# Patient Record
Sex: MALE | Race: WHITE | NOT HISPANIC OR LATINO | Employment: OTHER | URBAN - METROPOLITAN AREA
[De-identification: names, ages, dates, MRNs, and addresses within clinical notes are randomized per-mention and may not be internally consistent; named-entity substitution may affect disease eponyms.]

---

## 2024-04-26 ENCOUNTER — OFFICE VISIT (OUTPATIENT)
Dept: FAMILY MEDICINE | Facility: CLINIC | Age: 84
End: 2024-04-26
Payer: MEDICARE

## 2024-04-26 ENCOUNTER — LAB VISIT (OUTPATIENT)
Dept: LAB | Facility: CLINIC | Age: 84
End: 2024-04-26
Payer: MEDICARE

## 2024-04-26 VITALS
OXYGEN SATURATION: 98 % | HEIGHT: 73 IN | HEART RATE: 60 BPM | TEMPERATURE: 99 F | WEIGHT: 185.63 LBS | DIASTOLIC BLOOD PRESSURE: 72 MMHG | SYSTOLIC BLOOD PRESSURE: 134 MMHG | BODY MASS INDEX: 24.6 KG/M2

## 2024-04-26 DIAGNOSIS — E78.2 MIXED HYPERLIPIDEMIA: ICD-10-CM

## 2024-04-26 DIAGNOSIS — I10 PRIMARY HYPERTENSION: ICD-10-CM

## 2024-04-26 DIAGNOSIS — Z86.73 HISTORY OF CVA (CEREBROVASCULAR ACCIDENT): ICD-10-CM

## 2024-04-26 DIAGNOSIS — Z76.89 ENCOUNTER TO ESTABLISH CARE: ICD-10-CM

## 2024-04-26 DIAGNOSIS — I71.43 INFRARENAL ABDOMINAL AORTIC ANEURYSM (AAA) WITHOUT RUPTURE: ICD-10-CM

## 2024-04-26 DIAGNOSIS — Z95.1 HX OF CABG: ICD-10-CM

## 2024-04-26 DIAGNOSIS — E78.5 DYSLIPIDEMIA: ICD-10-CM

## 2024-04-26 DIAGNOSIS — M35.3 POLYMYALGIA RHEUMATICA: ICD-10-CM

## 2024-04-26 DIAGNOSIS — Z76.89 ENCOUNTER TO ESTABLISH CARE: Primary | ICD-10-CM

## 2024-04-26 PROBLEM — I71.40 ABDOMINAL AORTIC ANEURYSM WITHOUT RUPTURE: Status: ACTIVE | Noted: 2024-01-18

## 2024-04-26 PROBLEM — I25.10 ARTERIOSCLEROSIS OF CORONARY ARTERY: Status: ACTIVE | Noted: 2024-04-26

## 2024-04-26 PROBLEM — R73.03 PREDIABETES: Status: ACTIVE | Noted: 2024-04-26

## 2024-04-26 PROBLEM — I48.0 PAROXYSMAL ATRIAL FIBRILLATION: Status: ACTIVE | Noted: 2024-01-18

## 2024-04-26 PROBLEM — R26.0 ATAXIC GAIT: Status: ACTIVE | Noted: 2024-04-26

## 2024-04-26 PROBLEM — F32.9 MAJOR DEPRESSIVE DISORDER: Status: ACTIVE | Noted: 2024-04-26

## 2024-04-26 LAB
ALBUMIN SERPL BCP-MCNC: 3.8 G/DL (ref 3.5–5.2)
ALP SERPL-CCNC: 71 U/L (ref 55–135)
ALT SERPL W/O P-5'-P-CCNC: 16 U/L (ref 10–44)
ANION GAP SERPL CALC-SCNC: 8 MMOL/L (ref 8–16)
AST SERPL-CCNC: 21 U/L (ref 10–40)
BASOPHILS # BLD AUTO: 0.03 K/UL (ref 0–0.2)
BASOPHILS NFR BLD: 0.5 % (ref 0–1.9)
BILIRUB SERPL-MCNC: 1 MG/DL (ref 0.1–1)
BUN SERPL-MCNC: 11 MG/DL (ref 8–23)
CALCIUM SERPL-MCNC: 9.5 MG/DL (ref 8.7–10.5)
CHLORIDE SERPL-SCNC: 104 MMOL/L (ref 95–110)
CHOLEST SERPL-MCNC: 118 MG/DL (ref 120–199)
CHOLEST/HDLC SERPL: 2.5 {RATIO} (ref 2–5)
CO2 SERPL-SCNC: 27 MMOL/L (ref 23–29)
CREAT SERPL-MCNC: 0.9 MG/DL (ref 0.5–1.4)
DIFFERENTIAL METHOD BLD: ABNORMAL
EOSINOPHIL # BLD AUTO: 0.1 K/UL (ref 0–0.5)
EOSINOPHIL NFR BLD: 2.3 % (ref 0–8)
ERYTHROCYTE [DISTWIDTH] IN BLOOD BY AUTOMATED COUNT: 12.9 % (ref 11.5–14.5)
EST. GFR  (NO RACE VARIABLE): >60 ML/MIN/1.73 M^2
GLUCOSE SERPL-MCNC: 128 MG/DL (ref 70–110)
HCT VFR BLD AUTO: 41.9 % (ref 40–54)
HDLC SERPL-MCNC: 48 MG/DL (ref 40–75)
HDLC SERPL: 40.7 % (ref 20–50)
HGB BLD-MCNC: 14.3 G/DL (ref 14–18)
IMM GRANULOCYTES # BLD AUTO: 0.02 K/UL (ref 0–0.04)
IMM GRANULOCYTES NFR BLD AUTO: 0.3 % (ref 0–0.5)
LDLC SERPL CALC-MCNC: 54.4 MG/DL (ref 63–159)
LYMPHOCYTES # BLD AUTO: 1.3 K/UL (ref 1–4.8)
LYMPHOCYTES NFR BLD: 21.9 % (ref 18–48)
MCH RBC QN AUTO: 31.8 PG (ref 27–31)
MCHC RBC AUTO-ENTMCNC: 34.1 G/DL (ref 32–36)
MCV RBC AUTO: 93 FL (ref 82–98)
MONOCYTES # BLD AUTO: 0.5 K/UL (ref 0.3–1)
MONOCYTES NFR BLD: 8.2 % (ref 4–15)
NEUTROPHILS # BLD AUTO: 3.9 K/UL (ref 1.8–7.7)
NEUTROPHILS NFR BLD: 66.8 % (ref 38–73)
NONHDLC SERPL-MCNC: 70 MG/DL
NRBC BLD-RTO: 0 /100 WBC
PLATELET # BLD AUTO: 304 K/UL (ref 150–450)
PMV BLD AUTO: 8.8 FL (ref 9.2–12.9)
POTASSIUM SERPL-SCNC: 4.3 MMOL/L (ref 3.5–5.1)
PROT SERPL-MCNC: 6.5 G/DL (ref 6–8.4)
RBC # BLD AUTO: 4.49 M/UL (ref 4.6–6.2)
SODIUM SERPL-SCNC: 139 MMOL/L (ref 136–145)
TRIGL SERPL-MCNC: 78 MG/DL (ref 30–150)
TSH SERPL DL<=0.005 MIU/L-ACNC: 2.38 UIU/ML (ref 0.4–4)
VIT B12 SERPL-MCNC: 570 PG/ML (ref 210–950)
WBC # BLD AUTO: 5.76 K/UL (ref 3.9–12.7)

## 2024-04-26 PROCEDURE — 85025 COMPLETE CBC W/AUTO DIFF WBC: CPT | Performed by: STUDENT IN AN ORGANIZED HEALTH CARE EDUCATION/TRAINING PROGRAM

## 2024-04-26 PROCEDURE — 80053 COMPREHEN METABOLIC PANEL: CPT | Performed by: STUDENT IN AN ORGANIZED HEALTH CARE EDUCATION/TRAINING PROGRAM

## 2024-04-26 PROCEDURE — 80061 LIPID PANEL: CPT | Performed by: STUDENT IN AN ORGANIZED HEALTH CARE EDUCATION/TRAINING PROGRAM

## 2024-04-26 PROCEDURE — 36415 COLL VENOUS BLD VENIPUNCTURE: CPT | Mod: ,,, | Performed by: STUDENT IN AN ORGANIZED HEALTH CARE EDUCATION/TRAINING PROGRAM

## 2024-04-26 PROCEDURE — 84443 ASSAY THYROID STIM HORMONE: CPT | Performed by: STUDENT IN AN ORGANIZED HEALTH CARE EDUCATION/TRAINING PROGRAM

## 2024-04-26 PROCEDURE — 82607 VITAMIN B-12: CPT | Performed by: STUDENT IN AN ORGANIZED HEALTH CARE EDUCATION/TRAINING PROGRAM

## 2024-04-26 PROCEDURE — 99214 OFFICE O/P EST MOD 30 MIN: CPT | Mod: S$GLB,,, | Performed by: STUDENT IN AN ORGANIZED HEALTH CARE EDUCATION/TRAINING PROGRAM

## 2024-04-26 RX ORDER — TAMSULOSIN HYDROCHLORIDE 0.4 MG/1
CAPSULE ORAL DAILY
COMMUNITY

## 2024-04-26 RX ORDER — AMOXICILLIN 500 MG
CAPSULE ORAL DAILY
COMMUNITY

## 2024-04-26 RX ORDER — MULTIVITAMIN
1 TABLET ORAL DAILY
COMMUNITY

## 2024-04-26 RX ORDER — MONTELUKAST SODIUM 10 MG/1
10 TABLET ORAL NIGHTLY
COMMUNITY

## 2024-04-26 RX ORDER — CARVEDILOL 3.12 MG/1
3.12 TABLET ORAL 2 TIMES DAILY WITH MEALS
COMMUNITY

## 2024-04-26 RX ORDER — LANSOPRAZOLE 30 MG/1
30 TABLET, ORALLY DISINTEGRATING, DELAYED RELEASE ORAL DAILY
COMMUNITY

## 2024-04-26 RX ORDER — ERGOCALCIFEROL 1.25 MG/1
50000 CAPSULE ORAL
COMMUNITY

## 2024-04-26 RX ORDER — CLOTRIMAZOLE 1 G/ML
SOLUTION TOPICAL 2 TIMES DAILY
COMMUNITY

## 2024-04-26 RX ORDER — UBIDECARENONE 30 MG
30 CAPSULE ORAL 3 TIMES DAILY
COMMUNITY

## 2024-04-26 RX ORDER — ALBUTEROL SULFATE 0.63 MG/3ML
0.63 SOLUTION RESPIRATORY (INHALATION) EVERY 6 HOURS PRN
COMMUNITY

## 2024-04-26 RX ORDER — SIMVASTATIN 40 MG/1
40 TABLET, FILM COATED ORAL
COMMUNITY
Start: 2024-01-24

## 2024-04-26 RX ORDER — ASCORBIC ACID 500 MG
500 TABLET ORAL DAILY
COMMUNITY

## 2024-04-26 RX ORDER — HYDROXYCHLOROQUINE SULFATE 200 MG/1
TABLET, FILM COATED ORAL DAILY
COMMUNITY

## 2024-04-26 RX ORDER — FLUOXETINE HYDROCHLORIDE 40 MG/1
40 CAPSULE ORAL DAILY
COMMUNITY

## 2024-04-26 NOTE — PROGRESS NOTES
"  [facility]    [facility]  [address], [address]  [place] [address]    Subjective     Patient ID: Roy Kahler is a 84 y.o. male who comes to the clinic to establish care. Just moved here from [place].     Chief Complaint: Establish Care    COPD - quit smoking in 1996, smoked for 40 years    History of CABG - double bypass in 1996 in [place]    History of CVA - occurred in December 2023, is on eliquis 5mg twice daily    DLD - on simvastatin    Depression - takes fluoxetine    HTN - takes coreg 3.125mg BID    GERD - takes lansoprazole    AAA - seen on imaging in [place]    Polymyalgia Rheumatica - takes Plaquenil 200mg daily[place]    BPH - takes flomax    ROS negative unless stated above       Objective     Vitals:    04/26/24 0940   BP: 134/72   BP Location: Left arm   Patient Position: Sitting   BP Method: Medium (Manual)   Pulse: 60   Temp: 98.6 °F (37 °C)   SpO2: 98%   Weight: 84.2 kg (185 lb 9.6 oz)   Height: 6' 1" (1.854 m)        Wt Readings from Last 3 Encounters:   04/26/24 0940 84.2 kg (185 lb 9.6 oz)        Physical Exam  Vitals reviewed.   Constitutional:       Appearance: Normal appearance.   HENT:      Head: Normocephalic and atraumatic.   Eyes:      Extraocular Movements: Extraocular movements intact.      Pupils: Pupils are equal, round, and reactive to light.   Cardiovascular:      Rate and Rhythm: Normal rate.      Pulses: Normal pulses.      Heart sounds: Normal heart sounds.   Pulmonary:      Effort: Pulmonary effort is normal. No respiratory distress.      Breath sounds: Normal breath sounds.   Musculoskeletal:         General: Normal range of motion.      Cervical back: Normal range of motion and neck supple.   Skin:     General: Skin is dry.      Capillary Refill: Capillary refill takes less than 2 seconds.   Neurological:      General: No focal deficit present.      Mental Status: He is alert and " oriented to person, place, and time. Mental status is at baseline.   Psychiatric:         Mood and Affect: Mood normal.         Behavior: Behavior normal.         Thought Content: Thought content normal.         Current Outpatient Medications   Medication Instructions    albuterol (ACCUNEB) 0.63 mg, Nebulization, Every 6 hours PRN, Rescue    apixaban (ELIQUIS) 5 mg    ascorbic acid (vitamin C) (VITAMIN C) 500 mg, Oral, Daily    carvediloL (COREG) 3.125 mg, Oral, 2 times daily with meals    clotrimazole (LOTRIMIN) 1 % Soln Topical (Top), 2 times daily    co-enzyme Q-10 30 mg, Oral, 3 times daily    ergocalciferol (VITAMIN D2) 50,000 Units, Oral, Every 7 days    FLUoxetine 40 mg, Oral, Daily    hydroxychloroquine (PLAQUENIL) 200 mg tablet Oral, Daily    lansoprazole (PREVACID SOLUTAB) 30 mg, Oral, Daily    montelukast (SINGULAIR) 10 mg, Oral, Nightly    multivitamin (ONE DAILY MULTIVITAMIN) per tablet 1 tablet, Oral, Daily    omega-3 fatty acids/fish oil (FISH OIL-OMEGA-3 FATTY ACIDS) 300-1,000 mg capsule Oral, Daily    simvastatin (ZOCOR) 40 mg    tamsulosin (FLOMAX) 0.4 mg Cap Oral, Daily           Assessment and Plan     1. Encounter to establish care  -     Comprehensive metabolic panel; Future; Expected date: 04/26/2024  -     CBC auto differential; Future; Expected date: 04/26/2024  -     Vitamin B12; Future; Expected date: 04/26/2024  -     TSH; Future; Expected date: 04/26/2024    2. Mixed hyperlipidemia  -     Lipid panel; Future; Expected date: 04/26/2024    3. Hx of CABG  Overview:  Double bypass in 1996 in [place]      4. History of CVA (cerebrovascular accident)  Overview:  December 2023 in [place], still has gait issues      5. Infrarenal abdominal aortic aneurysm (AAA) without rupture  -     US Abdominal Aorta; Future; Expected date: 04/26/2024    6. Primary hypertension    7. Dyslipidemia    8. Polymyalgia rheumatica        Here to establish care. Very nice gentleman. Just moved here from  [place]    For PMR, I will write his plaquenil when he needs it    For HTN, at goal, continue coreg    For DLD, continue statin    For h/o CVA, continue statin and eliquis    For afib, continue eliquis    For depression, continue fluoxetine    Continue other chronic meds for conditions not listed above    Labs as above    RTC in 3 months, refills then         I encouraged the patient to take all medications as prescribed and to keep follow up appointments with their providers. Patient stated they had no other concerns. Questions were invited and answered. Follow up sooner if needed.     Cristhian Hu MD  04/26/2024 9:32 AM

## 2024-05-10 ENCOUNTER — HOSPITAL ENCOUNTER (OUTPATIENT)
Dept: RADIOLOGY | Facility: HOSPITAL | Age: 84
Discharge: HOME | End: 2024-05-10
Attending: STUDENT IN AN ORGANIZED HEALTH CARE EDUCATION/TRAINING PROGRAM
Payer: MEDICARE

## 2024-05-10 DIAGNOSIS — I71.43 INFRARENAL ABDOMINAL AORTIC ANEURYSM (AAA) WITHOUT RUPTURE: ICD-10-CM

## 2024-05-10 PROCEDURE — 76775 US EXAM ABDO BACK WALL LIM: CPT | Mod: TC

## 2024-05-10 PROCEDURE — 76775 US EXAM ABDO BACK WALL LIM: CPT | Mod: 26,,, | Performed by: RADIOLOGY

## 2024-07-08 RX ORDER — HYDROXYCHLOROQUINE SULFATE 200 MG/1
200 TABLET, FILM COATED ORAL DAILY
Qty: 90 TABLET | Refills: 1 | Status: SHIPPED | OUTPATIENT
Start: 2024-07-08

## 2024-07-08 NOTE — TELEPHONE ENCOUNTER
Last office visit: 4/26/2024  ----- Message from Enmanuel Mazariegos sent at 7/8/2024  1:34 PM CDT -----  Regarding: refill  Contact: patient  Type:  RX Refill Request    Who Called: patient  Refill or New Rx:new order/ new pharmacy  RX Name and Strength:hydroxychloroquine (PLAQUENIL) 200 mg tablet  How is the patient currently taking it? (ex. 1XDay):  Is this a 30 day or 90 day RX:90  Preferred Pharmacy with phone number:    [facility] - [place], [place] - [address]  [address] [address]  Phone: [phone] Fax: [phone]    Local or Mail Order:local  Ordering Provider:Rogerio  Would the patient rather a call back or a response via [facility]? New pharmacy  Best Call Back Number:[phone]  Additional Information:

## 2024-07-22 ENCOUNTER — OFFICE VISIT (OUTPATIENT)
Dept: FAMILY MEDICINE | Facility: CLINIC | Age: 84
End: 2024-07-22
Payer: MEDICARE

## 2024-07-22 VITALS
HEIGHT: 73 IN | SYSTOLIC BLOOD PRESSURE: 118 MMHG | WEIGHT: 187.5 LBS | HEART RATE: 70 BPM | OXYGEN SATURATION: 96 % | DIASTOLIC BLOOD PRESSURE: 68 MMHG | BODY MASS INDEX: 24.85 KG/M2

## 2024-07-22 DIAGNOSIS — I25.10 ARTERIOSCLEROSIS OF CORONARY ARTERY: ICD-10-CM

## 2024-07-22 DIAGNOSIS — I71.43 INFRARENAL ABDOMINAL AORTIC ANEURYSM (AAA) WITHOUT RUPTURE: ICD-10-CM

## 2024-07-22 DIAGNOSIS — M35.3 POLYMYALGIA RHEUMATICA: ICD-10-CM

## 2024-07-22 DIAGNOSIS — I48.0 PAROXYSMAL ATRIAL FIBRILLATION: ICD-10-CM

## 2024-07-22 DIAGNOSIS — I10 PRIMARY HYPERTENSION: Primary | ICD-10-CM

## 2024-07-22 PROCEDURE — 99214 OFFICE O/P EST MOD 30 MIN: CPT | Mod: S$GLB,,, | Performed by: STUDENT IN AN ORGANIZED HEALTH CARE EDUCATION/TRAINING PROGRAM

## 2024-07-22 PROCEDURE — G2211 COMPLEX E/M VISIT ADD ON: HCPCS | Mod: S$GLB,,, | Performed by: STUDENT IN AN ORGANIZED HEALTH CARE EDUCATION/TRAINING PROGRAM

## 2024-07-22 RX ORDER — HYDROXYCHLOROQUINE SULFATE 200 MG/1
200 TABLET, FILM COATED ORAL DAILY
Qty: 90 TABLET | Refills: 1 | Status: SHIPPED | OUTPATIENT
Start: 2024-07-22

## 2024-07-22 NOTE — PROGRESS NOTES
"  [facility]    [facility]  [address], [address]  [place] [address]    Subjective     Patient ID: Roy Kahler is a 84 y.o. male who comes to the clinic for a follow up visit.    Chief Complaint: Medication Refill (Patient is here for a medication refill. )    COPD - quit smoking in 1996, smoked for 40 years     History of CABG - double bypass in 1996 in [place]     History of CVA - occurred in December 2023, is on eliquis 5mg twice daily    DLD - on simvastatin    Depression - takes fluoxetine     HTN - takes coreg 3.125mg BID    GERD - takes lansoprazole    AAA - seen on imaging in [place]    Polymyalgia Rheumatica - takes Plaquenil 200mg daily, does not see a rheumatologist but previously saw one in [place]     BPH - takes flomax    ROS negative unless stated above       Objective     Vitals:    07/22/24 1248   BP: 118/68   Pulse: 70   SpO2: 96%   Weight: 85 kg (187 lb 8 oz)   Height: 6' 1" (1.854 m)        Wt Readings from Last 3 Encounters:   07/22/24 1248 85 kg (187 lb 8 oz)   04/26/24 0940 84.2 kg (185 lb 9.6 oz)        Physical Exam  Vitals reviewed.   Constitutional:       Appearance: Normal appearance.   HENT:      Head: Normocephalic and atraumatic.   Eyes:      Extraocular Movements: Extraocular movements intact.      Pupils: Pupils are equal, round, and reactive to light.   Cardiovascular:      Rate and Rhythm: Normal rate.   Pulmonary:      Effort: Pulmonary effort is normal. No respiratory distress.   Musculoskeletal:         General: Normal range of motion.      Cervical back: Normal range of motion and neck supple.   Skin:     General: Skin is dry.      Capillary Refill: Capillary refill takes less than 2 seconds.   Neurological:      General: No focal deficit present.      Mental Status: He is alert and oriented to person, place, and time. Mental status is at baseline.   Psychiatric:         Mood and Affect: Mood normal.         Behavior: " Behavior normal.         Thought Content: Thought content normal.         Current Outpatient Medications   Medication Instructions    albuterol (ACCUNEB) 0.63 mg, Nebulization, Every 6 hours PRN, Rescue    apixaban (ELIQUIS) 5 mg    ascorbic acid (vitamin C) (VITAMIN C) 500 mg, Oral, Daily    carvediloL (COREG) 3.125 mg, Oral, 2 times daily with meals    clotrimazole (LOTRIMIN) 1 % Soln Topical (Top), 2 times daily    co-enzyme Q-10 30 mg, Oral, 3 times daily    ergocalciferol (VITAMIN D2) 50,000 Units, Oral, Every 7 days    FLUoxetine 40 mg, Oral, Daily    hydroxychloroquine (PLAQUENIL) 200 mg, Oral, Daily    lansoprazole (PREVACID SOLUTAB) 30 mg, Oral, Daily    montelukast (SINGULAIR) 10 mg, Oral, Nightly    multivitamin (ONE DAILY MULTIVITAMIN) per tablet 1 tablet, Oral, Daily    omega-3 fatty acids/fish oil (FISH OIL-OMEGA-3 FATTY ACIDS) 300-1,000 mg capsule Oral, Daily    simvastatin (ZOCOR) 40 mg    tamsulosin (FLOMAX) 0.4 mg Cap Oral, Daily           Assessment and Plan     1. Primary hypertension    2. Paroxysmal atrial fibrillation    3. Arteriosclerosis of coronary artery    4. Infrarenal abdominal aortic aneurysm (AAA) without rupture    5. Polymyalgia rheumatica  -     hydroxychloroquine (PLAQUENIL) 200 mg tablet; Take 1 tablet (200 mg total) by mouth once daily.  Dispense: 90 tablet; Refill: 1        Here for follow up    For PMR, I will write his plaquenil when he needs it     For HTN, at goal, continue coreg     For DLD, continue statin     For h/o CVA, continue statin and eliquis     For afib, continue eliquis     For depression, continue fluoxetine     Continue other chronic meds for conditions not listed above    RTC in 6months         I encouraged the patient to take all medications as prescribed and to keep follow up appointments with their providers. Patient stated they had no other concerns. Questions were invited and answered. Follow up sooner if needed.     Cristhian Hu MD  07/22/2024  12:53 PM

## 2025-01-23 ENCOUNTER — OFFICE VISIT (OUTPATIENT)
Dept: FAMILY MEDICINE | Facility: CLINIC | Age: 85
End: 2025-01-23
Payer: MEDICARE

## 2025-01-23 VITALS
HEART RATE: 58 BPM | WEIGHT: 200 LBS | OXYGEN SATURATION: 96 % | RESPIRATION RATE: 18 BRPM | HEIGHT: 73 IN | BODY MASS INDEX: 26.51 KG/M2 | DIASTOLIC BLOOD PRESSURE: 82 MMHG | SYSTOLIC BLOOD PRESSURE: 155 MMHG

## 2025-01-23 DIAGNOSIS — Z95.1 HX OF CABG: ICD-10-CM

## 2025-01-23 DIAGNOSIS — R79.9 ABNORMAL FINDING OF BLOOD CHEMISTRY, UNSPECIFIED: ICD-10-CM

## 2025-01-23 DIAGNOSIS — R73.03 PREDIABETES: ICD-10-CM

## 2025-01-23 DIAGNOSIS — I48.0 PAROXYSMAL ATRIAL FIBRILLATION: ICD-10-CM

## 2025-01-23 DIAGNOSIS — I10 PRIMARY HYPERTENSION: ICD-10-CM

## 2025-01-23 DIAGNOSIS — I50.32 CHRONIC DIASTOLIC CONGESTIVE HEART FAILURE: Primary | ICD-10-CM

## 2025-01-23 PROCEDURE — 99214 OFFICE O/P EST MOD 30 MIN: CPT | Mod: S$GLB,,, | Performed by: STUDENT IN AN ORGANIZED HEALTH CARE EDUCATION/TRAINING PROGRAM

## 2025-01-23 PROCEDURE — G2211 COMPLEX E/M VISIT ADD ON: HCPCS | Mod: S$GLB,,, | Performed by: STUDENT IN AN ORGANIZED HEALTH CARE EDUCATION/TRAINING PROGRAM

## 2025-01-23 RX ORDER — ATORVASTATIN CALCIUM 40 MG/1
1 TABLET, FILM COATED ORAL NIGHTLY
COMMUNITY
Start: 2024-07-26

## 2025-01-23 RX ORDER — LISINOPRIL 20 MG/1
20 TABLET ORAL DAILY
Qty: 90 TABLET | Refills: 1 | Status: SHIPPED | OUTPATIENT
Start: 2025-01-23

## 2025-01-23 RX ORDER — LISINOPRIL 10 MG/1
10 TABLET ORAL
COMMUNITY
Start: 2024-04-30 | End: 2025-01-23 | Stop reason: SDUPTHER

## 2025-01-23 RX ORDER — AZELASTINE 1 MG/ML
SPRAY, METERED NASAL
COMMUNITY
Start: 2024-04-30

## 2025-01-23 NOTE — PROGRESS NOTES
"  [facility]    [facility]  [address], [address]  [place] [address]    Subjective     Patient ID: Roy Kahler is a 84 y.o. male who comes to the clinic for a follow up visit.    Chief Complaint: managment of chronic condition (6 mos f/u)    COPD - quit smoking in 1996, smoked for 40 years     History of CABG - double bypass in 1996 in [place]     History of CVA - occurred in December 2023, is on eliquis 5mg twice daily    DLD - on simvastatin     Depression - takes fluoxetine     HTN - takes coreg 3.125mg BID and lisinopril 10mg daily     GERD - takes lansoprazole     AAA - seen on imaging in [place]     Polymyalgia Rheumatica - takes Plaquenil 200mg daily, does not see a rheumatologist but previously saw one in [place]     BPH - takes flomax    ROS negative unless stated above       Objective     Vitals:    01/23/25 1252   BP: (!) 163/84   BP Location: Right arm   Patient Position: Sitting   Pulse: (!) 58   Resp: 18   SpO2: 96%   Weight: 90.7 kg (200 lb)   Height: 6' 1" (1.854 m)        Wt Readings from Last 3 Encounters:   01/23/25 1252 90.7 kg (200 lb)   07/22/24 1248 85 kg (187 lb 8 oz)   04/26/24 0940 84.2 kg (185 lb 9.6 oz)        Physical Exam  Constitutional:       General: He is not in acute distress.     Appearance: Normal appearance. He is not ill-appearing.   HENT:      Head: Normocephalic and atraumatic.      Mouth/Throat:      Mouth: Mucous membranes are moist.   Eyes:      Extraocular Movements: Extraocular movements intact.      Pupils: Pupils are equal, round, and reactive to light.   Cardiovascular:      Rate and Rhythm: Normal rate.   Pulmonary:      Effort: Pulmonary effort is normal. No respiratory distress.   Musculoskeletal:         General: Normal range of motion.      Cervical back: Normal range of motion and neck supple.   Skin:     General: Skin is warm and dry.   Neurological:      General: No focal deficit present.      Mental " Status: He is alert and oriented to person, place, and time. Mental status is at baseline.   Psychiatric:         Mood and Affect: Mood normal.         Behavior: Behavior normal.         Thought Content: Thought content normal.         Current Outpatient Medications   Medication Instructions    albuterol (ACCUNEB) 0.63 mg, Every 6 hours PRN    apixaban (ELIQUIS) 5 mg    ascorbic acid (vitamin C) (VITAMIN C) 500 mg, Daily    atorvastatin (LIPITOR) 40 MG tablet 1 tablet, Nightly    azelastine (ASTELIN) 137 mcg (0.1 %) nasal spray by Nasal route.    carvediloL (COREG) 3.125 mg, 2 times daily with meals    clotrimazole (LOTRIMIN) 1 % Soln 2 times daily    co-enzyme Q-10 30 mg, 3 times daily    ergocalciferol (VITAMIN D2) 50,000 Units, Every 7 days    FLUoxetine 40 mg, Daily    hydroxychloroquine (PLAQUENIL) 200 mg, Oral, Daily    lansoprazole (PREVACID SOLUTAB) 30 mg, Daily    lisinopriL 10 mg    montelukast (SINGULAIR) 10 mg, Nightly    multivitamin (ONE DAILY MULTIVITAMIN) per tablet 1 tablet, Daily    omega-3 fatty acids/fish oil (FISH OIL-OMEGA-3 FATTY ACIDS) 300-1,000 mg capsule Daily    polyvinyl alcohol-povidon,PF, 1.4-0.6 % Dpet Apply to eye.    simvastatin (ZOCOR) 40 mg    tamsulosin (FLOMAX) 0.4 mg Cap Daily           Assessment and Plan     1. Chronic diastolic congestive heart failure  -     Comprehensive metabolic panel; Future; Expected date: 01/23/2025    2. Paroxysmal atrial fibrillation  -     CBC auto differential; Future; Expected date: 01/23/2025    3. Prediabetes  -     Hemoglobin A1c; Future; Expected date: 01/23/2025    4. Hx of CABG  Overview:  Double bypass in 1996 in [place]    Orders:  -     Lipid panel; Future; Expected date: 01/23/2025    5. Abnormal finding of blood chemistry, unspecified  -     Lipid panel; Future; Expected date: 01/23/2025      Here for follow up.    For HTN, above goal, will increase lisinopril 10mg to 20mg daily, f/u in 1 month    For PMR, I will write his plaquenil  when he needs it     For DLD, continue statin    For h/o CVA, continue statin and eliquis     For afib, continue eliquis    For depression, continue fluoxetine    The visit today included increased complexity associated with the care of an episodic problem, namely CHF, that was addressed and managed via longitudinal care.    RTC in 1 month, BP, BMP        I encouraged the patient to take all medications as prescribed and to keep follow up appointments with their providers. ED precautions given for more concerning issues. Questions were invited and answered. Patient stated they had no other concerns. Follow up sooner if needed.     Cristhian Hu MD  01/23/2025 1:03 PM

## 2025-01-27 ENCOUNTER — LAB VISIT (OUTPATIENT)
Dept: LAB | Facility: CLINIC | Age: 85
End: 2025-01-27
Payer: MEDICARE

## 2025-01-27 DIAGNOSIS — Z95.1 HX OF CABG: ICD-10-CM

## 2025-01-27 DIAGNOSIS — I50.32 CHRONIC DIASTOLIC CONGESTIVE HEART FAILURE: ICD-10-CM

## 2025-01-27 DIAGNOSIS — R73.03 PREDIABETES: ICD-10-CM

## 2025-01-27 DIAGNOSIS — I48.0 PAROXYSMAL ATRIAL FIBRILLATION: ICD-10-CM

## 2025-01-27 DIAGNOSIS — R79.9 ABNORMAL FINDING OF BLOOD CHEMISTRY, UNSPECIFIED: ICD-10-CM

## 2025-01-27 LAB
ALBUMIN SERPL BCP-MCNC: 3.8 G/DL (ref 3.5–5.2)
ALP SERPL-CCNC: 80 U/L (ref 40–150)
ALT SERPL W/O P-5'-P-CCNC: 18 U/L (ref 10–44)
ANION GAP SERPL CALC-SCNC: 10 MMOL/L (ref 8–16)
AST SERPL-CCNC: 22 U/L (ref 10–40)
BASOPHILS # BLD AUTO: 0.04 K/UL (ref 0–0.2)
BASOPHILS NFR BLD: 0.6 % (ref 0–1.9)
BILIRUB SERPL-MCNC: 0.7 MG/DL (ref 0.1–1)
BUN SERPL-MCNC: 12 MG/DL (ref 8–23)
CALCIUM SERPL-MCNC: 9.4 MG/DL (ref 8.7–10.5)
CHLORIDE SERPL-SCNC: 106 MMOL/L (ref 95–110)
CHOLEST SERPL-MCNC: 102 MG/DL (ref 120–199)
CHOLEST/HDLC SERPL: 2 {RATIO} (ref 2–5)
CO2 SERPL-SCNC: 24 MMOL/L (ref 23–29)
CREAT SERPL-MCNC: 0.9 MG/DL (ref 0.5–1.4)
DIFFERENTIAL METHOD BLD: ABNORMAL
EOSINOPHIL # BLD AUTO: 0.3 K/UL (ref 0–0.5)
EOSINOPHIL NFR BLD: 4.3 % (ref 0–8)
ERYTHROCYTE [DISTWIDTH] IN BLOOD BY AUTOMATED COUNT: 12.7 % (ref 11.5–14.5)
EST. GFR  (NO RACE VARIABLE): >60 ML/MIN/1.73 M^2
ESTIMATED AVG GLUCOSE: 114 MG/DL (ref 68–131)
GLUCOSE SERPL-MCNC: 108 MG/DL (ref 70–110)
HBA1C MFR BLD: 5.6 % (ref 4–5.6)
HCT VFR BLD AUTO: 38.9 % (ref 40–54)
HDLC SERPL-MCNC: 50 MG/DL (ref 40–75)
HDLC SERPL: 49 % (ref 20–50)
HGB BLD-MCNC: 12.8 G/DL (ref 14–18)
IMM GRANULOCYTES # BLD AUTO: 0.02 K/UL (ref 0–0.04)
IMM GRANULOCYTES NFR BLD AUTO: 0.3 % (ref 0–0.5)
LDLC SERPL CALC-MCNC: 41 MG/DL (ref 63–159)
LYMPHOCYTES # BLD AUTO: 1.6 K/UL (ref 1–4.8)
LYMPHOCYTES NFR BLD: 25.8 % (ref 18–48)
MCH RBC QN AUTO: 31.4 PG (ref 27–31)
MCHC RBC AUTO-ENTMCNC: 32.9 G/DL (ref 32–36)
MCV RBC AUTO: 95 FL (ref 82–98)
MONOCYTES # BLD AUTO: 0.5 K/UL (ref 0.3–1)
MONOCYTES NFR BLD: 8.4 % (ref 4–15)
NEUTROPHILS # BLD AUTO: 3.8 K/UL (ref 1.8–7.7)
NEUTROPHILS NFR BLD: 60.6 % (ref 38–73)
NONHDLC SERPL-MCNC: 52 MG/DL
NRBC BLD-RTO: 0 /100 WBC
PLATELET # BLD AUTO: 228 K/UL (ref 150–450)
PMV BLD AUTO: 9.9 FL (ref 9.2–12.9)
POTASSIUM SERPL-SCNC: 4.9 MMOL/L (ref 3.5–5.1)
PROT SERPL-MCNC: 6.4 G/DL (ref 6–8.4)
RBC # BLD AUTO: 4.08 M/UL (ref 4.6–6.2)
SODIUM SERPL-SCNC: 140 MMOL/L (ref 136–145)
TRIGL SERPL-MCNC: 55 MG/DL (ref 30–150)
WBC # BLD AUTO: 6.29 K/UL (ref 3.9–12.7)

## 2025-01-27 PROCEDURE — 80053 COMPREHEN METABOLIC PANEL: CPT | Performed by: STUDENT IN AN ORGANIZED HEALTH CARE EDUCATION/TRAINING PROGRAM

## 2025-01-27 PROCEDURE — 83036 HEMOGLOBIN GLYCOSYLATED A1C: CPT | Performed by: STUDENT IN AN ORGANIZED HEALTH CARE EDUCATION/TRAINING PROGRAM

## 2025-01-27 PROCEDURE — 80061 LIPID PANEL: CPT | Performed by: STUDENT IN AN ORGANIZED HEALTH CARE EDUCATION/TRAINING PROGRAM

## 2025-01-27 PROCEDURE — 36415 COLL VENOUS BLD VENIPUNCTURE: CPT | Mod: ,,, | Performed by: STUDENT IN AN ORGANIZED HEALTH CARE EDUCATION/TRAINING PROGRAM

## 2025-01-27 PROCEDURE — 85025 COMPLETE CBC W/AUTO DIFF WBC: CPT | Performed by: STUDENT IN AN ORGANIZED HEALTH CARE EDUCATION/TRAINING PROGRAM

## 2025-01-28 ENCOUNTER — TELEPHONE (OUTPATIENT)
Dept: FAMILY MEDICINE | Facility: CLINIC | Age: 85
End: 2025-01-28
Payer: MEDICARE

## 2025-01-28 LAB
ALBUMIN SERPL BCP-MCNC: 4 G/DL
ALP SERPL-CCNC: 74 U/L (ref 25–125)
ALT SERPL W P-5'-P-CCNC: 17 U/L (ref 10–40)
ANION GAP SERPL CALC-SCNC: 9.9 MEQ/L
AST SERPL-CCNC: 26 U/L (ref 14–40)
BILIRUB SERPL-MCNC: 0.8 MG/DL (ref 0.1–1.4)
BUN SERPL-MCNC: 12 MG/DL
CALCIUM SERPL-MCNC: 9.2 MG/DL
CHLORIDE SERPL-SCNC: 106 MMOL/L (ref 99–108)
CHOLEST SERPL-MSCNC: 107 MG/DL (ref 0–200)
CO2 SERPL-SCNC: 23 MMOL/L
CREAT SERPL-MCNC: 0.87 MG/DL
EGFR: 84
GLUCOSE SERPL-MCNC: 122 MG/DL
HDLC SERPL-MCNC: 52 MG/DL (ref 35–70)
LDL CHOLESTEROL DIRECT: 40 MG/DL
POTASSIUM BLOOD: 4.4
PROT SERPL-MCNC: 7.1 G/DL
SODIUM BLOOD: 139
TRIGL SERPL-MCNC: 58 MG/DL (ref 40–160)
URATE SERPL-MCNC: 5 MG/DL

## 2025-01-28 NOTE — TELEPHONE ENCOUNTER
----- Message from Cristhian Hu MD sent at 1/27/2025  3:53 PM CST -----  Please let patient know:    Mild anemia seen, will repeat in 3 months    Otherwise labs normal

## 2025-02-27 ENCOUNTER — OFFICE VISIT (OUTPATIENT)
Dept: FAMILY MEDICINE | Facility: CLINIC | Age: 85
End: 2025-02-27
Payer: MEDICARE

## 2025-02-27 VITALS
WEIGHT: 202 LBS | HEIGHT: 73 IN | DIASTOLIC BLOOD PRESSURE: 67 MMHG | RESPIRATION RATE: 20 BRPM | OXYGEN SATURATION: 98 % | SYSTOLIC BLOOD PRESSURE: 132 MMHG | HEART RATE: 62 BPM | BODY MASS INDEX: 26.77 KG/M2

## 2025-02-27 DIAGNOSIS — I10 PRIMARY HYPERTENSION: Primary | ICD-10-CM

## 2025-02-27 DIAGNOSIS — R73.03 PREDIABETES: ICD-10-CM

## 2025-02-27 DIAGNOSIS — E78.5 DYSLIPIDEMIA: ICD-10-CM

## 2025-02-27 PROCEDURE — 99214 OFFICE O/P EST MOD 30 MIN: CPT | Mod: S$GLB,,, | Performed by: STUDENT IN AN ORGANIZED HEALTH CARE EDUCATION/TRAINING PROGRAM

## 2025-02-27 PROCEDURE — G2211 COMPLEX E/M VISIT ADD ON: HCPCS | Mod: S$GLB,,, | Performed by: STUDENT IN AN ORGANIZED HEALTH CARE EDUCATION/TRAINING PROGRAM

## 2025-02-27 RX ORDER — CARVEDILOL 3.12 MG/1
1 TABLET ORAL 2 TIMES DAILY
COMMUNITY
Start: 2024-04-30

## 2025-02-27 RX ORDER — FLUOXETINE HYDROCHLORIDE 20 MG/1
40 CAPSULE ORAL
COMMUNITY
Start: 2024-04-30 | End: 2025-02-27

## 2025-02-27 NOTE — PROGRESS NOTES
"  [facility]    [facility]  [address]  [place] [address]    Subjective     Patient ID: Roy Kahler is a 84 y.o. male who comes to the clinic for a follow up visit.    Chief Complaint: Managment of chronic condition  (Follow up)    COPD - quit smoking in 1996, smoked for 40 years     History of CABG - double bypass in 1996 in [place]     History of CVA - occurred in December 2023, is on eliquis 5mg twice daily     DLD - on simvastatin     Depression - takes fluoxetine     HTN - takes coreg 3.125mg BID and amlodipine 10mg daily, was taken off lisinopril     GERD - takes lansoprazole     AAA - seen on imaging in [place]     Polymyalgia Rheumatica - takes Plaquenil 200mg daily, does not see a rheumatologist but previously saw one in [place]     BPH - takes flomax    ROS negative unless stated above       Objective     Vitals:    02/27/25 1121   BP: 132/67   BP Location: Right arm   Patient Position: Sitting   Pulse: 62   Resp: 20   SpO2: 98%   Weight: 91.6 kg (202 lb)   Height: 6' 1" (1.854 m)        Wt Readings from Last 3 Encounters:   02/27/25 1121 91.6 kg (202 lb)   01/23/25 1252 90.7 kg (200 lb)   07/22/24 1248 85 kg (187 lb 8 oz)        Physical Exam  Constitutional:       General: He is not in acute distress.     Appearance: Normal appearance. He is not ill-appearing.   HENT:      Head: Normocephalic and atraumatic.      Mouth/Throat:      Mouth: Mucous membranes are moist.   Eyes:      Extraocular Movements: Extraocular movements intact.      Pupils: Pupils are equal, round, and reactive to light.   Cardiovascular:      Rate and Rhythm: Normal rate.   Pulmonary:      Effort: Pulmonary effort is normal. No respiratory distress.   Musculoskeletal:         General: Normal range of motion.      Cervical back: Normal range of motion and neck supple.   Skin:     General: Skin is warm and dry.   Neurological:      General: No focal deficit " present.      Mental Status: He is alert and oriented to person, place, and time. Mental status is at baseline.   Psychiatric:         Mood and Affect: Mood normal.         Behavior: Behavior normal.         Thought Content: Thought content normal.         Current Outpatient Medications   Medication Instructions    albuterol (ACCUNEB) 0.63 mg, Every 6 hours PRN    apixaban (ELIQUIS) 5 mg    ascorbic acid (vitamin C) (VITAMIN C) 500 mg, Daily    atorvastatin (LIPITOR) 40 MG tablet 1 tablet, Nightly    azelastine (ASTELIN) 137 mcg (0.1 %) nasal spray by Nasal route.    carvediloL (COREG) 3.125 MG tablet 1 tablet, 2 times daily    carvediloL (COREG) 3.125 mg, 2 times daily with meals    clotrimazole (LOTRIMIN) 1 % Soln 2 times daily    co-enzyme Q-10 30 mg, 3 times daily    ergocalciferol (VITAMIN D2) 50,000 Units, Every 7 days    FLUoxetine 40 mg, Daily    hydroxychloroquine (PLAQUENIL) 200 mg, Oral, Daily    lansoprazole (PREVACID SOLUTAB) 30 mg, Daily    montelukast (SINGULAIR) 10 mg, Nightly    multivitamin (ONE DAILY MULTIVITAMIN) per tablet 1 tablet, Daily    omega-3 fatty acids/fish oil (FISH OIL-OMEGA-3 FATTY ACIDS) 300-1,000 mg capsule Daily    polyvinyl alcohol-povidon,PF, 1.4-0.6 % Dpet Apply to eye.    simvastatin (ZOCOR) 40 mg    tamsulosin (FLOMAX) 0.4 mg Cap Daily           Assessment and Plan     1. Primary hypertension    2. Prediabetes    3. Dyslipidemia        Here for follow up.    For hypertension, at goal, continue amlodipine and Coreg as above    For hyperlipidemia, continue simvastatin    Continue other medicines as above    The visit today included increased complexity associated with the care of an episodic problem, namely HTN, that was addressed and managed via longitudinal care.    RTC in 4 months        I encouraged the patient to take all medications as prescribed and to keep follow up appointments with their providers. ED precautions given for more concerning issues. Questions were  invited and answered. Patient stated they had no other concerns. Follow up sooner if needed.     Cristhian Hu MD  02/27/2025 11:24 AM

## 2025-04-13 ENCOUNTER — PATIENT OUTREACH (OUTPATIENT)
Dept: ADMINISTRATIVE | Facility: HOSPITAL | Age: 85
End: 2025-04-13
Payer: MEDICARE

## 2025-06-23 DIAGNOSIS — Z00.00 ENCOUNTER FOR MEDICARE ANNUAL WELLNESS EXAM: ICD-10-CM

## 2025-06-27 ENCOUNTER — OFFICE VISIT (OUTPATIENT)
Dept: FAMILY MEDICINE | Facility: CLINIC | Age: 85
End: 2025-06-27
Payer: MEDICARE

## 2025-06-27 VITALS
WEIGHT: 197.75 LBS | OXYGEN SATURATION: 99 % | HEIGHT: 73 IN | BODY MASS INDEX: 26.21 KG/M2 | SYSTOLIC BLOOD PRESSURE: 140 MMHG | HEART RATE: 65 BPM | DIASTOLIC BLOOD PRESSURE: 70 MMHG | RESPIRATION RATE: 20 BRPM

## 2025-06-27 DIAGNOSIS — I10 PRIMARY HYPERTENSION: Primary | ICD-10-CM

## 2025-06-27 DIAGNOSIS — R73.03 PREDIABETES: ICD-10-CM

## 2025-06-27 RX ORDER — CARVEDILOL 6.25 MG/1
6.25 TABLET ORAL 2 TIMES DAILY WITH MEALS
Qty: 180 TABLET | Refills: 1 | Status: SHIPPED | OUTPATIENT
Start: 2025-06-27

## 2025-06-27 RX ORDER — FLUOXETINE 20 MG/1
40 CAPSULE ORAL
COMMUNITY
Start: 2025-06-05

## 2025-06-27 RX ORDER — TAMSULOSIN HYDROCHLORIDE 0.4 MG/1
0.4 CAPSULE ORAL
COMMUNITY
Start: 2025-01-28

## 2025-06-27 RX ORDER — AMLODIPINE AND BENAZEPRIL HYDROCHLORIDE 10; 40 MG/1; MG/1
CAPSULE ORAL
COMMUNITY
Start: 2025-02-11

## 2025-06-27 RX ORDER — CLOTRIMAZOLE 1 %
CREAM (GRAM) TOPICAL
COMMUNITY
Start: 2024-10-24

## 2025-06-27 NOTE — PROGRESS NOTES
"  [facility]    [facility]  [address], [address]  [place] [address]    Subjective     Patient ID: Roy Kahler is a 85 y.o. male who comes to the clinic for a follow up visit.    Chief Complaint: Health Maintenance (Follow up)    COPD - quit smoking in 1996, smoked for 40 years     History of CABG - double bypass in 1996 in [place]     History of CVA - occurred in December 2023, is on eliquis 5mg twice daily     DLD - on simvastatin     Depression - takes fluoxetine     HTN - takes coreg 3.125mg BID and amlodipine 10mg daily, was taken off lisinopril     GERD - takes lansoprazole     AAA - seen on imaging in [place]     Polymyalgia Rheumatica - takes Plaquenil 200mg daily, does not see a rheumatologist but previously saw one in [place]     BPH - takes flomax    ROS negative unless stated above       Objective     Vitals:    06/27/25 1027   BP: (!) 140/60   BP Location: Right arm   Patient Position: Sitting   Pulse: 65   Resp: 20   SpO2: 99%   Weight: 89.7 kg (197 lb 12 oz)   Height: 6' 1" (1.854 m)        Wt Readings from Last 3 Encounters:   06/27/25 1027 89.7 kg (197 lb 12 oz)   02/27/25 1121 91.6 kg (202 lb)   01/23/25 1252 90.7 kg (200 lb)        Physical Exam    Current Outpatient Medications   Medication Instructions    albuterol (ACCUNEB) 0.63 mg, Every 6 hours PRN    amLODIPine-benazepriL (LOTREL) 10-40 mg per capsule Take by mouth.    apixaban (ELIQUIS) 5 mg    ascorbic acid (vitamin C) (VITAMIN C) 500 mg, Daily    atorvastatin (LIPITOR) 40 MG tablet 1 tablet, Nightly    azelastine (ASTELIN) 137 mcg (0.1 %) nasal spray by Nasal route.    carvediloL (COREG) 6.25 mg, Oral, 2 times daily with meals    clotrimazole (LOTRIMIN) 1 % cream Apply topically.    clotrimazole (LOTRIMIN) 1 % Soln 2 times daily    co-enzyme Q-10 30 mg, 3 times daily    ergocalciferol (VITAMIN D2) 50,000 Units, Every 7 days    FLUoxetine 40 mg, Daily    FLUoxetine 40 mg    " hydroxychloroquine (PLAQUENIL) 200 mg, Oral, Daily    lansoprazole (PREVACID SOLUTAB) 30 mg, Daily    montelukast (SINGULAIR) 10 mg, Nightly    multivitamin (ONE DAILY MULTIVITAMIN) per tablet 1 tablet, Daily    omega-3 fatty acids/fish oil (FISH OIL-OMEGA-3 FATTY ACIDS) 300-1,000 mg capsule Daily    polyvinyl alcohol-povidon,PF, 1.4-0.6 % Dpet Apply to eye.    simvastatin (ZOCOR) 40 mg    tamsulosin (FLOMAX) 0.4 mg Cap Daily    tamsulosin (FLOMAX) 0.4 mg           Assessment and Plan     1. Primary hypertension  -     carvediloL (COREG) 6.25 MG tablet; Take 1 tablet (6.25 mg total) by mouth 2 (two) times daily with meals.  Dispense: 180 tablet; Refill: 1    2. Prediabetes        Here for follow up.    For HTN, above goal, increasing coreg to 6.25mg BID, continue other meds as above    For prediabetes, continue lifestyle measures    The visit today included increased complexity associated with the care of an episodic problem, namely HTN, that was addressed and managed via longitudinal care.    RTC in 1 month        I encouraged the patient to take all medications as prescribed and to keep follow up appointments with their providers. ED precautions given for more concerning issues. Questions were invited and answered. Patient stated they had no other concerns. Follow up sooner if needed.     Cristhian Hu MD  06/27/2025 10:36 AM